# Patient Record
Sex: MALE | HISPANIC OR LATINO
[De-identification: names, ages, dates, MRNs, and addresses within clinical notes are randomized per-mention and may not be internally consistent; named-entity substitution may affect disease eponyms.]

---

## 2022-05-26 PROBLEM — Z00.00 ENCOUNTER FOR PREVENTIVE HEALTH EXAMINATION: Status: ACTIVE | Noted: 2022-05-26

## 2022-05-27 ENCOUNTER — APPOINTMENT (OUTPATIENT)
Dept: PEDIATRIC ORTHOPEDIC SURGERY | Facility: CLINIC | Age: 62
End: 2022-05-27
Payer: COMMERCIAL

## 2022-05-27 VITALS — WEIGHT: 230 LBS | BODY MASS INDEX: 36.96 KG/M2 | HEIGHT: 66 IN

## 2022-05-27 DIAGNOSIS — Z83.3 FAMILY HISTORY OF DIABETES MELLITUS: ICD-10-CM

## 2022-05-27 DIAGNOSIS — S83.242A OTHER TEAR OF MEDIAL MENISCUS, CURRENT INJURY, LEFT KNEE, INITIAL ENCOUNTER: ICD-10-CM

## 2022-05-27 DIAGNOSIS — Z78.9 OTHER SPECIFIED HEALTH STATUS: ICD-10-CM

## 2022-05-27 DIAGNOSIS — Z72.89 OTHER PROBLEMS RELATED TO LIFESTYLE: ICD-10-CM

## 2022-05-27 DIAGNOSIS — Z82.49 FAMILY HISTORY OF ISCHEMIC HEART DISEASE AND OTHER DISEASES OF THE CIRCULATORY SYSTEM: ICD-10-CM

## 2022-05-27 DIAGNOSIS — M17.12 UNILATERAL PRIMARY OSTEOARTHRITIS, LEFT KNEE: ICD-10-CM

## 2022-05-27 PROCEDURE — 73560 X-RAY EXAM OF KNEE 1 OR 2: CPT

## 2022-05-27 PROCEDURE — 99202 OFFICE O/P NEW SF 15 MIN: CPT

## 2022-05-27 PROCEDURE — 99072 ADDL SUPL MATRL&STAF TM PHE: CPT

## 2022-05-27 RX ORDER — AMLODIPINE BESYLATE AND BENAZEPRIL HYDROCHLORIDE 10; 20 MG/1; MG/1
10-20 CAPSULE ORAL
Refills: 0 | Status: ACTIVE | COMMUNITY

## 2022-05-27 RX ORDER — MELOXICAM 15 MG/1
15 TABLET ORAL
Qty: 30 | Refills: 1 | Status: ACTIVE | COMMUNITY
Start: 2022-05-27 | End: 1900-01-01

## 2022-05-27 NOTE — HISTORY OF PRESENT ILLNESS
[de-identified] : This 61-year-old gentleman is seen for evaluation of his left knee.  This patient has had 4-5 weeks of discomfort to his left knee with no obvious history of trauma precipitating event.  No locking buckling or sensation of instability.  Because of concern he did see an orthopedist May 4 who injected him and ordered MRI exam.  No medications or physical therapy and it is to be noted that prior to this past month he had no complaints of significance.  Past history includes hypertension for which she is on Lotrel

## 2022-05-27 NOTE — ASSESSMENT
[FreeTextEntry1] : Impression: Degenerative joint disease with tear medial meniscus left knee.\par \par Insofar as he is feeling a little bit better and his exam is not terribly impressive I have made him aware as to the MRI findings and these do not necessarily require surgical intervention at this time.  I have advised conservative management he has been referred for physical therapy and placed on Mobic.  I will see him back in a month's time.  He has been made aware if he is not improving and is unhappy so to speak then an arthroscopic procedure would be warranted.

## 2022-05-27 NOTE — PHYSICAL EXAM
[de-identified] : On exam today he is walking with a mild limp on the left side.  He has good motion to the left hip left knee has full motion as well on today's visit.  He has a mild effusion.  There is no instability on stress though valgus stress does cause him discomfort.  He does not open up.  Lachman anterior and posterior drawer are negative.  He does have pain over the femoral origin of the medial collateral ligament mild to the medial compartment and he does have pain over the posterior medial joint line with a mildly positive Steinmann.  The lateral compartment and patellofemoral space are benign to exam.  Popliteal fossa calf neurovascular exam are negative.\par \par I have reviewed his MRI which was just performed 3 days ago and this reveals a complex tear of the medial meniscus along the body and partial posterior horn.  There are degenerative changes present most pronounced in the medial compartment.\par \par Standing x-rays of the knee were performed today revealing a mild to moderate medial joint space narrowing no loose body/lesion

## 2022-05-27 NOTE — CONSULT LETTER
[Dear  ___] : Dear  [unfilled], [Consult Letter:] : I had the pleasure of evaluating your patient, [unfilled]. [Please see my note below.] : Please see my note below. [Consult Closing:] : Thank you very much for allowing me to participate in the care of this patient.  If you have any questions, please do not hesitate to contact me. [Sincerely,] : Sincerely, [FreeTextEntry3] : Dr Jan Waite JR.\par

## 2022-06-24 ENCOUNTER — APPOINTMENT (OUTPATIENT)
Dept: PEDIATRIC ORTHOPEDIC SURGERY | Facility: CLINIC | Age: 62
End: 2022-06-24